# Patient Record
Sex: MALE | Race: WHITE | NOT HISPANIC OR LATINO | Employment: FULL TIME | URBAN - METROPOLITAN AREA
[De-identification: names, ages, dates, MRNs, and addresses within clinical notes are randomized per-mention and may not be internally consistent; named-entity substitution may affect disease eponyms.]

---

## 2019-11-21 ENCOUNTER — HOSPITAL ENCOUNTER (EMERGENCY)
Facility: HOSPITAL | Age: 39
Discharge: HOME | End: 2019-11-21
Attending: EMERGENCY MEDICINE
Payer: COMMERCIAL

## 2019-11-21 VITALS
TEMPERATURE: 97.9 F | DIASTOLIC BLOOD PRESSURE: 124 MMHG | BODY MASS INDEX: 30.8 KG/M2 | SYSTOLIC BLOOD PRESSURE: 204 MMHG | WEIGHT: 220 LBS | RESPIRATION RATE: 16 BRPM | OXYGEN SATURATION: 97 % | HEIGHT: 71 IN | HEART RATE: 85 BPM

## 2019-11-21 DIAGNOSIS — I10 ASYMPTOMATIC HYPERTENSION: Primary | ICD-10-CM

## 2019-11-21 PROCEDURE — 99284 EMERGENCY DEPT VISIT MOD MDM: CPT | Performed by: EMERGENCY MEDICINE

## 2019-11-21 PROCEDURE — 99283 EMERGENCY DEPT VISIT LOW MDM: CPT

## 2019-11-21 NOTE — DISCHARGE INSTRUCTIONS
Please follow up with your PCP regarding your blood pressure  Take blood pressure medication as prescribed  It will take at least 1 week for the medication to take effect, possibly longer  Take blood pressure at home as directed by your PCP  If your blood pressure is high but you have no symptoms, there is no need to go to the ER  If your blood pressure remains uncontrolled despite taking the medications for a couple weeks, call your PCP for further direction  Return to the ER immediately with severe, sudden headache, new vision changes, new weakness or numbness of an arm or leg, speech difficulty, chest pain or pressure, shortness of breath, severe abdominal pain, or any other concerning symptoms  Have labs performed as directed

## 2019-11-21 NOTE — ED PROVIDER NOTES
History  Chief Complaint   Patient presents with    Hypertension     Pt reports starting new blood pressure medication yesterday  Pt states, "I'm here because my wife was freaking out  She took it and it was like 80s"     14-year-old male with past medical history of hypertension and hyperlipidemia who is presenting due to asymptomatic hypertension  Patient reports that he recently established with a new PCP yesterday  His blood pressure was found to be elevated and he was started on lisinopril-hydrochlorothiazide 20-12 5 mg  Patient took 1 dose of his medication last night  He has not yet taken today's dose  Patient reports that his wife took his blood pressure at home just prior to arrival and found that his systolic blood pressure was in the 180s  Per patient, his wife freaked out and told him to come to the ER  Patient reports that he would not have come to the ER if his wife did not tell him to do so  Patient is without any complaints  He denies any headache, vision changes, focal numbness or weakness, chest pain or pressure, shortness of breath, nausea, vomiting, abdominal pain, leg swelling, or any other symptoms at this time  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[employer]  He is very active in his job and is able to perform his job without any difficulties  Reviewed records from [facility]  Confirmed that patient was started on above mentioned antihypertensives just yesterday  Patient's wife called the PCP's office this afternoon and reported fatigue    When I asked the patient about this, he reported that he had slept only 4 hours last night and wanted just take a nap after work  He denies any fatigue  None       History reviewed  No pertinent past medical history  History reviewed  No pertinent surgical history  History reviewed  No pertinent family history  I have reviewed and agree with the history as documented      Social History     Tobacco Use    Smoking status: Not on file   Substance Use Topics    Alcohol use: Not on file    Drug use: Not on file        Review of Systems   Constitutional: Negative for diaphoresis, fever and unexpected weight change  HENT: Negative for congestion, rhinorrhea and sore throat  Eyes: Negative for pain, discharge and visual disturbance  Respiratory: Negative for cough, shortness of breath and wheezing  Cardiovascular: Negative for chest pain, palpitations and leg swelling  Gastrointestinal: Negative for abdominal pain, blood in stool, constipation, diarrhea, nausea and vomiting  Genitourinary: Negative for dysuria, flank pain and hematuria  Musculoskeletal: Negative for arthralgias and myalgias  Skin: Negative for rash and wound  Allergic/Immunologic: Negative for environmental allergies and food allergies  Neurological: Negative for dizziness, seizures, weakness and numbness  Hematological: Negative for adenopathy  Psychiatric/Behavioral: Negative for confusion and hallucinations  Physical Exam  ED Triage Vitals [11/21/19 1807]   Temperature Pulse Respirations Blood Pressure SpO2   97 9 °F (36 6 °C) 85 16 (!) 204/124 97 %      Temp Source Heart Rate Source Patient Position - Orthostatic VS BP Location FiO2 (%)   Oral -- -- -- --      Pain Score       No Pain             Orthostatic Vital Signs  Vitals:    11/21/19 1807   BP: (!) 204/124   Pulse: 85       Physical Exam   Constitutional: He is oriented to person, place, and time  He appears well-developed and well-nourished  HENT:   Head: Normocephalic and atraumatic  Right Ear: External ear normal    Left Ear: External ear normal    Nose: Nose normal    Eyes: Pupils are equal, round, and reactive to light  EOM are normal    Neck: Normal range of motion  Neck supple  Cardiovascular: Normal rate, regular rhythm and normal heart sounds  No murmur heard  Pulmonary/Chest: Effort normal and breath sounds normal  No respiratory distress   He has no wheezes  He has no rales  Abdominal: Soft  Bowel sounds are normal  He exhibits no distension  There is no tenderness  There is no guarding  Musculoskeletal: Normal range of motion  He exhibits no edema or deformity  Neurological: He is alert and oriented to person, place, and time  Patient is alert and oriented to time, person, place, and situation  Speech is fluent with no aphasia or dysarthria  CN II-XII are intact  Strength is 5/5 in the upper and lower extremities bilaterally  Sensation grossly intact  No dysmetria on finger to nose testing  No pronator drift  Skin: Skin is warm and dry  Capillary refill takes less than 2 seconds  He is not diaphoretic  Psychiatric: He has a normal mood and affect  His behavior is normal    Nursing note and vitals reviewed  ED Medications  Medications - No data to display    Diagnostic Studies  Results Reviewed     None                 No orders to display         Procedures  Procedures        ED Course  ED Course as of Nov 22 0206   Thu Nov 21, 2019   1818 Blood Pressure(!): 204/124                               MDM  Number of Diagnoses or Management Options  Asymptomatic hypertension: established and worsening  Diagnosis management comments:     Patient presented with asymptomatic hypertension  He came into the ER because his wife instructed him to do so  Patient had no complaints  He had recently been started on a blood pressure medication yesterday for hypertension which was diagnosed by his PCP  Patient only had taken 1 dose of this blood pressure medication  Discharged patient with PCP follow-up  Communicated importance of taking antihypertensives and long-term blood pressure control  Discussed return precautions  Patient verbalized understanding         Amount and/or Complexity of Data Reviewed  Decide to obtain previous medical records or to obtain history from someone other than the patient: yes  Review and summarize past medical records: yes    Risk of Complications, Morbidity, and/or Mortality  Presenting problems: minimal  Diagnostic procedures: minimal  Management options: minimal    Patient Progress  Patient progress: improved      Disposition  Final diagnoses:   Asymptomatic hypertension     Time reflects when diagnosis was documented in both MDM as applicable and the Disposition within this note     Time User Action Codes Description Comment    11/21/2019  6:45 PM Sara Marchpraneeth Add [I10] Asymptomatic hypertension       ED Disposition     ED Disposition Condition Date/Time Comment    Discharge Good Thu Nov 21, 2019  6:45 PM Erika Benton discharge to home/self care  Follow-up Information     Follow up With Specialties Details Why Juwan Silva MD Family Medicine Call in 1 day Please follow up with your PCP  [facility] [address] Emergency Department Emergency Medicine Go to  If symptoms worsen  [address]  [phone]  ED, [address] [place] [address], [facility]   [phone]          There are no discharge medications for this patient  No discharge procedures on file  ED Provider  Attending physically available and evaluated Erika Benton I managed the patient along with the ED Attending      Electronically Signed by         Yolande Santacruz MD  11/22/19 9391

## 2019-11-25 NOTE — ED ATTENDING ATTESTATION
11/21/2019  I, Jacky Gonzalez DO, saw and evaluated the patient  I have discussed the patient with the resident/non-physician practitioner and agree with the resident's/non-physician practitioner's findings, Plan of Care, and MDM as documented in the resident's/non-physician practitioner's note, except where noted  All available labs and Radiology studies were reviewed  I was present for key portions of any procedure(s) performed by the resident/non-physician practitioner and I was immediately available to provide assistance  At this point I agree with the current assessment done in the Emergency Department  I have conducted an independent evaluation of this patient a history and physical is as follows:    42 yom with htn, asymptomatic  Was placed on antihyperentsive by pcp yesterday, bp at home over 200  No symptoms  Looks well, is scheduled for outpatient labs  will dc  ED Course         Critical Care Time  Procedures

## 2021-05-01 ENCOUNTER — IMMUNIZATIONS (OUTPATIENT)
Dept: FAMILY MEDICINE CLINIC | Facility: HOSPITAL | Age: 41
End: 2021-05-01

## 2021-05-01 DIAGNOSIS — Z23 ENCOUNTER FOR IMMUNIZATION: Primary | ICD-10-CM

## 2021-05-01 PROCEDURE — 91300 SARS-COV-2 / COVID-19 MRNA VACCINE (PFIZER-BIONTECH) 30 MCG: CPT

## 2021-05-01 PROCEDURE — 0001A SARS-COV-2 / COVID-19 MRNA VACCINE (PFIZER-BIONTECH) 30 MCG: CPT

## 2021-05-22 ENCOUNTER — IMMUNIZATIONS (OUTPATIENT)
Dept: FAMILY MEDICINE CLINIC | Facility: HOSPITAL | Age: 41
End: 2021-05-22

## 2021-05-22 DIAGNOSIS — Z23 ENCOUNTER FOR IMMUNIZATION: Primary | ICD-10-CM

## 2021-05-22 PROCEDURE — 0002A SARS-COV-2 / COVID-19 MRNA VACCINE (PFIZER-BIONTECH) 30 MCG: CPT

## 2021-05-22 PROCEDURE — 91300 SARS-COV-2 / COVID-19 MRNA VACCINE (PFIZER-BIONTECH) 30 MCG: CPT
